# Patient Record
Sex: FEMALE | Race: WHITE | URBAN - METROPOLITAN AREA
[De-identification: names, ages, dates, MRNs, and addresses within clinical notes are randomized per-mention and may not be internally consistent; named-entity substitution may affect disease eponyms.]

---

## 2023-11-11 PROCEDURE — 87635 SARS-COV-2 COVID-19 AMP PRB: CPT

## 2023-11-12 ENCOUNTER — LAB REQUISITION (OUTPATIENT)
Dept: LAB | Facility: HOSPITAL | Age: 23
End: 2023-11-12

## 2023-11-12 DIAGNOSIS — J06.9 ACUTE UPPER RESPIRATORY INFECTION, UNSPECIFIED: ICD-10-CM

## 2023-11-12 LAB — SARS-COV-2 RNA RESP QL NAA+PROBE: NOT DETECTED

## 2025-03-31 ENCOUNTER — OFFICE VISIT (OUTPATIENT)
Dept: URGENT CARE | Age: 25
End: 2025-03-31
Payer: COMMERCIAL

## 2025-03-31 VITALS
RESPIRATION RATE: 20 BRPM | TEMPERATURE: 97.7 F | HEART RATE: 91 BPM | SYSTOLIC BLOOD PRESSURE: 118 MMHG | OXYGEN SATURATION: 97 % | DIASTOLIC BLOOD PRESSURE: 80 MMHG

## 2025-03-31 DIAGNOSIS — J06.9 VIRAL URI: ICD-10-CM

## 2025-03-31 DIAGNOSIS — Z91.89 AT INCREASED RISK OF EXPOSURE TO COVID-19 VIRUS: ICD-10-CM

## 2025-03-31 LAB — POC SARS-COV-2 AG BINAX: NORMAL

## 2025-03-31 PROCEDURE — 1036F TOBACCO NON-USER: CPT | Performed by: PHYSICIAN ASSISTANT

## 2025-03-31 PROCEDURE — 99213 OFFICE O/P EST LOW 20 MIN: CPT | Performed by: PHYSICIAN ASSISTANT

## 2025-03-31 PROCEDURE — 87811 SARS-COV-2 COVID19 W/OPTIC: CPT | Performed by: PHYSICIAN ASSISTANT

## 2025-03-31 RX ORDER — SERTRALINE HYDROCHLORIDE 100 MG/1
1 TABLET, FILM COATED ORAL
COMMUNITY
Start: 2025-02-06

## 2025-03-31 ASSESSMENT — ENCOUNTER SYMPTOMS
CHILLS: 1
SINUS PRESSURE: 0
SINUS PAIN: 0
VOMITING: 0
ABDOMINAL PAIN: 0
NAUSEA: 0
HEADACHES: 0
DIZZINESS: 0
CHEST TIGHTNESS: 0
FEVER: 0
SORE THROAT: 1
SHORTNESS OF BREATH: 0
VOICE CHANGE: 0
RHINORRHEA: 1
CONFUSION: 0
AGITATION: 0
FATIGUE: 0
COUGH: 1
DIARRHEA: 0

## 2025-03-31 NOTE — PROGRESS NOTES
Subjective   Patient ID: Ines Aponte is a 24 y.o. female. They present today with a chief complaint of Nasal Congestion and Sore Throat (X 2 days -  mom is covid positive.).    History of Present Illness  Pt is here fore cold symptoms x 3 days. Reports started with sore throat, then developed congestion and mild cough. Reports chills but no fever. Denies SOB, N/V. Mom probably tested positive for COVID but not sure. Denies significant medical history. She is taking OTC cold meds with some relief.       Sore Throat   Associated symptoms include congestion and coughing. Pertinent negatives include no abdominal pain, diarrhea, headaches, shortness of breath or vomiting.       Past Medical History  Allergies as of 03/31/2025    (No Known Allergies)       (Not in a hospital admission)       History reviewed. No pertinent past medical history.    History reviewed. No pertinent surgical history.     reports that she has never smoked. She has never used smokeless tobacco.    Review of Systems  Review of Systems   Constitutional:  Positive for chills. Negative for fatigue and fever.   HENT:  Positive for congestion, rhinorrhea and sore throat. Negative for sinus pressure, sinus pain, sneezing and voice change.    Respiratory:  Positive for cough. Negative for chest tightness and shortness of breath.    Cardiovascular:  Negative for chest pain.   Gastrointestinal:  Negative for abdominal pain, diarrhea, nausea and vomiting.   Skin:  Negative for rash.   Neurological:  Negative for dizziness and headaches.   Psychiatric/Behavioral:  Negative for agitation and confusion.                                   Objective    Vitals:    03/31/25 1447   BP: 118/80   Pulse: 91   Resp: 20   Temp: 36.5 °C (97.7 °F)   TempSrc: Temporal   SpO2: 97%     No LMP recorded.    Physical Exam  Constitutional:       Appearance: Normal appearance.   HENT:      Head: Normocephalic and atraumatic.      Right Ear: Tympanic membrane, ear canal and  external ear normal.      Left Ear: Tympanic membrane, ear canal and external ear normal.      Nose: Nose normal. No rhinorrhea.      Mouth/Throat:      Pharynx: No oropharyngeal exudate or posterior oropharyngeal erythema.   Cardiovascular:      Rate and Rhythm: Normal rate and regular rhythm.      Heart sounds: No murmur heard.  Pulmonary:      Effort: Pulmonary effort is normal.      Breath sounds: Normal breath sounds. No wheezing.   Abdominal:      General: Abdomen is flat.      Palpations: Abdomen is soft.   Musculoskeletal:         General: Normal range of motion.   Lymphadenopathy:      Cervical: No cervical adenopathy.   Neurological:      Mental Status: She is alert and oriented to person, place, and time.   Psychiatric:         Mood and Affect: Mood normal.         Procedures    Point of Care Test & Imaging Results from this visit  Results for orders placed or performed in visit on 03/31/25   POCT Covid-19 Rapid Antigen   Result Value Ref Range    POC BAKARI-COV-2 AG  Presumptive negative test for SARS-CoV-2 (no antigen detected)     Presumptive negative test for SARS-CoV-2 (no antigen detected)      Imaging  No results found.    Cardiology, Vascular, and Other Imaging  No other imaging results found for the past 2 days      Diagnostic study results (if any) were reviewed by Shawnee Rojas PA-C.    Assessment/Plan   Allergies, medications, history, and pertinent labs/EKGs/Imaging reviewed by Shawnee Rojas PA-C.     Medical Decision Making  Rapid COVID negative, mom tested positive for RSV, possible RSV infection  Lung CTAB, Vitals wnl, no signs of pneumonia  Suspicious for viral syndrome, at this point safe to manage symptoms at home    Orders and Diagnoses  Diagnoses and all orders for this visit:  Viral URI  -     POCT Covid-19 Rapid Antigen  At increased risk of exposure to COVID-19 virus  -     POCT Covid-19 Rapid Antigen      Medical Admin Record      Patient disposition: Home    Electronically signed by Shawnee  JEFF Rojas  3:21 PM